# Patient Record
Sex: FEMALE | Race: WHITE | Employment: FULL TIME | URBAN - METROPOLITAN AREA
[De-identification: names, ages, dates, MRNs, and addresses within clinical notes are randomized per-mention and may not be internally consistent; named-entity substitution may affect disease eponyms.]

---

## 2017-01-06 PROBLEM — R00.1 SINUS BRADYCARDIA: Status: ACTIVE | Noted: 2017-01-06

## 2018-01-15 LAB
LEFT VENTRICULAR EJECTION FRACTION HIGH VALUE: 65 %
LEFT VENTRICULAR EJECTION FRACTION MODE: NORMAL
LV EF: 60 %

## 2019-01-14 ENCOUNTER — OFFICE VISIT (OUTPATIENT)
Dept: CARDIOLOGY CLINIC | Age: 34
End: 2019-01-14
Payer: MEDICARE

## 2019-01-14 VITALS
RESPIRATION RATE: 16 BRPM | WEIGHT: 107 LBS | HEART RATE: 66 BPM | SYSTOLIC BLOOD PRESSURE: 116 MMHG | DIASTOLIC BLOOD PRESSURE: 62 MMHG | HEIGHT: 57 IN | BODY MASS INDEX: 23.08 KG/M2

## 2019-01-14 DIAGNOSIS — I38 VHD (VALVULAR HEART DISEASE): ICD-10-CM

## 2019-01-14 DIAGNOSIS — Q90.9 DOWN SYNDROME: ICD-10-CM

## 2019-01-14 DIAGNOSIS — Q24.9 CONGENITAL HEART DISEASE: Primary | ICD-10-CM

## 2019-01-14 DIAGNOSIS — I36.1 NON-RHEUMATIC TRICUSPID VALVE INSUFFICIENCY: ICD-10-CM

## 2019-01-14 DIAGNOSIS — M71.22 SYNOVIAL CYST OF LEFT KNEE: ICD-10-CM

## 2019-01-14 DIAGNOSIS — M21.42 FLAT FEET, BILATERAL: ICD-10-CM

## 2019-01-14 DIAGNOSIS — I45.10 RBBB (RIGHT BUNDLE BRANCH BLOCK): ICD-10-CM

## 2019-01-14 DIAGNOSIS — I34.0 NON-RHEUMATIC MITRAL REGURGITATION: ICD-10-CM

## 2019-01-14 DIAGNOSIS — Q21.20 AV CANAL: ICD-10-CM

## 2019-01-14 DIAGNOSIS — I44.4 LAFB (LEFT ANTERIOR FASCICULAR BLOCK): ICD-10-CM

## 2019-01-14 DIAGNOSIS — E03.9 HYPOTHYROIDISM, UNSPECIFIED TYPE: ICD-10-CM

## 2019-01-14 DIAGNOSIS — M21.41 FLAT FEET, BILATERAL: ICD-10-CM

## 2019-01-14 PROCEDURE — G8484 FLU IMMUNIZE NO ADMIN: HCPCS | Performed by: INTERNAL MEDICINE

## 2019-01-14 PROCEDURE — 1036F TOBACCO NON-USER: CPT | Performed by: INTERNAL MEDICINE

## 2019-01-14 PROCEDURE — 93000 ELECTROCARDIOGRAM COMPLETE: CPT | Performed by: INTERNAL MEDICINE

## 2019-01-14 PROCEDURE — 99214 OFFICE O/P EST MOD 30 MIN: CPT | Performed by: INTERNAL MEDICINE

## 2019-01-14 PROCEDURE — G8427 DOCREV CUR MEDS BY ELIG CLIN: HCPCS | Performed by: INTERNAL MEDICINE

## 2019-01-14 PROCEDURE — G8420 CALC BMI NORM PARAMETERS: HCPCS | Performed by: INTERNAL MEDICINE

## 2019-01-14 RX ORDER — LEVOTHYROXINE SODIUM 88 UG/1
88 TABLET ORAL
COMMUNITY
End: 2021-03-22

## 2019-02-18 ENCOUNTER — HOSPITAL ENCOUNTER (OUTPATIENT)
Dept: CARDIOLOGY | Age: 34
Discharge: HOME | End: 2019-02-18
Payer: MEDICARE

## 2019-02-18 DIAGNOSIS — I34.0 NON-RHEUMATIC MITRAL REGURGITATION: ICD-10-CM

## 2019-02-18 DIAGNOSIS — Q24.9 CONGENITAL HEART DISEASE: ICD-10-CM

## 2019-02-18 DIAGNOSIS — Q21.20 AV CANAL: ICD-10-CM

## 2019-02-18 DIAGNOSIS — I38 VHD (VALVULAR HEART DISEASE): ICD-10-CM

## 2019-02-18 DIAGNOSIS — I36.1 NON-RHEUMATIC TRICUSPID VALVE INSUFFICIENCY: ICD-10-CM

## 2019-02-18 LAB
LV EF: 63 %
LVEF MODALITY: NORMAL

## 2019-02-18 PROCEDURE — 93306 TTE W/DOPPLER COMPLETE: CPT

## 2019-02-25 ENCOUNTER — TELEPHONE (OUTPATIENT)
Dept: CARDIOLOGY CLINIC | Age: 34
End: 2019-02-25

## 2019-02-27 ENCOUNTER — TELEPHONE (OUTPATIENT)
Dept: CARDIOLOGY CLINIC | Age: 34
End: 2019-02-27

## 2019-03-01 ENCOUNTER — TELEPHONE (OUTPATIENT)
Dept: CARDIOLOGY CLINIC | Age: 34
End: 2019-03-01

## 2020-01-13 ENCOUNTER — TELEPHONE (OUTPATIENT)
Dept: ADMINISTRATIVE | Age: 35
End: 2020-01-13

## 2020-01-13 ENCOUNTER — TELEPHONE (OUTPATIENT)
Dept: CARDIOLOGY CLINIC | Age: 35
End: 2020-01-13

## 2020-01-13 NOTE — TELEPHONE ENCOUNTER
Poly's mother called regarding her daughters yearly echo. Danyelle Ramírez gets an echo ordered every year due to  AV Canal and mild to moderate leak. Danyelle Ramírez does not have an Echo ordered and her mother  would like to have it done prior to coming in to see you in February.    Please advise

## 2020-02-13 ENCOUNTER — HOSPITAL ENCOUNTER (OUTPATIENT)
Dept: CARDIOLOGY | Age: 35
Discharge: HOME | End: 2020-02-13
Payer: MEDICARE

## 2020-02-13 LAB
LV EF: 60 %
LVEF MODALITY: NORMAL

## 2020-02-13 PROCEDURE — 93306 TTE W/DOPPLER COMPLETE: CPT

## 2020-02-21 ENCOUNTER — OFFICE VISIT (OUTPATIENT)
Dept: CARDIOLOGY CLINIC | Age: 35
End: 2020-02-21
Payer: MEDICARE

## 2020-02-21 VITALS
BODY MASS INDEX: 22.44 KG/M2 | DIASTOLIC BLOOD PRESSURE: 60 MMHG | HEIGHT: 57 IN | SYSTOLIC BLOOD PRESSURE: 110 MMHG | RESPIRATION RATE: 16 BRPM | WEIGHT: 104 LBS | HEART RATE: 54 BPM

## 2020-02-21 PROCEDURE — 99213 OFFICE O/P EST LOW 20 MIN: CPT | Performed by: INTERNAL MEDICINE

## 2020-02-21 PROCEDURE — 1036F TOBACCO NON-USER: CPT | Performed by: INTERNAL MEDICINE

## 2020-02-21 PROCEDURE — 93000 ELECTROCARDIOGRAM COMPLETE: CPT | Performed by: INTERNAL MEDICINE

## 2020-02-21 PROCEDURE — G8420 CALC BMI NORM PARAMETERS: HCPCS | Performed by: INTERNAL MEDICINE

## 2020-02-21 PROCEDURE — G8427 DOCREV CUR MEDS BY ELIG CLIN: HCPCS | Performed by: INTERNAL MEDICINE

## 2020-02-21 PROCEDURE — G8484 FLU IMMUNIZE NO ADMIN: HCPCS | Performed by: INTERNAL MEDICINE

## 2020-02-21 RX ORDER — LEVOTHYROXINE SODIUM 0.07 MG/1
75 TABLET ORAL DAILY
COMMUNITY

## 2020-02-25 NOTE — PROGRESS NOTES
or cold intolerance. MUSCULOSKELETAL: Positive for bilateral knee pain, the left is worse. She also has flat feet.     SKIN: Denies rashes, ulcers or itching. HEME/LYMPH: Denies lymphadenopathy, bleeding or easy bruisability. HEART: As above. LUNGS: She denies cough or sputum production. GI: Denies abdominal pain,nausea, vomiting, diarrhea, constipation, dark or bloody stools. : Denies hematuria or dysuria. PSYCHIATRIC: Denies mood changes, anxiety or depression. NEUROLOGIC: Denies memory deficit, motor weakness, numbness, tingling or tremors. No dizziness or syncope         CARDIOVASCULAR HISTORY:   1. AV canal defect, status post repair at age 10 months:  a. Echocardiogram done on 11/05/13 was read by Dr. Michael Zamarripa (pediatric cardiologist) as showing normal pulmonary venous drainage to the left atrium. Normal size atria with intact intra-atrial septum. Thickened mitral valve leaflets with mild-to-moderate mitral valve regurgitation via the cleft of the anterior leaflet and the coaptation point and mild tricuspid valve regurgitation. Normal size ventricles with intact interventricular septum and normal biventricular function. Normal pulmonic valve without stenosis and with mild pulmonic valve regurgitation. Bilateral pulmonary valve stenosis, mild. Normal aortic valve without stenosis or insufficiency. Normal aortic arch without coarctation. No pericardial effusion or vegetation seen. The coronary arteries appeared to rise normally from the appropriate Sinus of Valsalva.   b. Echocardiogram done on 1/4/2016 showed normal left ventricular size and wall thickness with septal motion consistent with post open heart state with normal left ventricular systolic function with an ejection fraction estimated at 60% and normal left ventricular diastolic function, normal right ventricular size and function. Mild to moderate mitral regurgitation, mild tricuspid regurgitation, mild pulmonary regurgitation. upstrokes normal bilaterally. No thyromegaly. Sclerae not icteric. No xanthelasmas. Mucous membranes moist.   Chest:              Symmetrical and nontender.  Well healed sternotomy incision. Lungs:             Clear bilaterally. Heart:              Normal S1. Split S2. Grade 2/6 systolic murmur heard at the apex. Grade 2/6 systolic murmur heard at the right upper sternal border. Grade 2/6 systolic murmur heard at the left upper sternal border. Abdomen:        Soft without organomegaly or masses. No bruits. Extremities:     Trace to 1+ pitting peritibial and ankle edema bilaterally. Pulses normal.   Skin:                Normal turgor. No rashes or ulcers noted. Neuro:             Oriented x 3. No motor or sensory deficit detected. REVIEW OF DIAGNOSTIC TESTS:    1. Blood tests from 1/13/2020 reviewed:  CBC unremarkable. BUN 14, creatinine 0.7, potassium 4.3, GFR > 60, LFT's normal.  Total cholesterol 178, triglycerides 61, HDL 67, LDL 99. TSH 0.40 (low) free T3, 3.69. Free T4 1.33.    2.  EKG done today showed sinus bradycardia with a heart rate of 53 bpm with left ventricular hypertrophy with right bundle branch block and left anterior fascicular block. ASSESSMENT / DIAGNOSIS:   1. Down's Syndrome. 2. Congenital heart disease: AV canal defect, status post surgical repair with mild mitral regurgitation and mild tricuspid regurgitation on echocardiogram in 2/2020.  3. Right bundle branch block. 4. Left anterior fascicular block. 5. Sinus bradycardia. 6. Hypothyroidism: On replacement therapy  7. Flat feet. 8. History of Baker's cysts.        TREATMENT PLAN:  1. Reassure. 2.  Thyroid replacement therapy adjustment as per endocrinology. 3.  Patient advised to continue with SBE prophylaxis. 4.  Follow up in 1 year or on a prn basis. Jony Haddad.  [place] [id number]  [phone]

## 2020-11-17 ENCOUNTER — TELEPHONE (OUTPATIENT)
Dept: CARDIOLOGY CLINIC | Age: 35
End: 2020-11-17

## 2021-01-04 ENCOUNTER — TELEPHONE (OUTPATIENT)
Dept: CARDIOLOGY CLINIC | Age: 36
End: 2021-01-04

## 2021-03-04 ENCOUNTER — TELEPHONE (OUTPATIENT)
Dept: CARDIOLOGY | Age: 36
End: 2021-03-04

## 2021-03-04 NOTE — TELEPHONE ENCOUNTER
Left patient's mother a message that Dr Bhavesh Ramachandran reviewed Lola Ernie last echo and she does not need to have one at this time. Echo appointment for 3/8 was cancelled.

## 2021-03-09 ENCOUNTER — TELEPHONE (OUTPATIENT)
Dept: CARDIOLOGY CLINIC | Age: 36
End: 2021-03-09

## 2021-03-22 ENCOUNTER — OFFICE VISIT (OUTPATIENT)
Dept: CARDIOLOGY CLINIC | Age: 36
End: 2021-03-22
Payer: MEDICARE

## 2021-03-22 VITALS
SYSTOLIC BLOOD PRESSURE: 116 MMHG | HEART RATE: 54 BPM | DIASTOLIC BLOOD PRESSURE: 65 MMHG | HEIGHT: 57 IN | BODY MASS INDEX: 22.22 KG/M2 | WEIGHT: 103 LBS | RESPIRATION RATE: 16 BRPM

## 2021-03-22 DIAGNOSIS — I45.2 RBBB (RIGHT BUNDLE BRANCH BLOCK WITH LEFT ANTERIOR FASCICULAR BLOCK): ICD-10-CM

## 2021-03-22 DIAGNOSIS — I34.0 NONRHEUMATIC MITRAL VALVE REGURGITATION: Primary | ICD-10-CM

## 2021-03-22 PROCEDURE — 1036F TOBACCO NON-USER: CPT | Performed by: INTERNAL MEDICINE

## 2021-03-22 PROCEDURE — G8427 DOCREV CUR MEDS BY ELIG CLIN: HCPCS | Performed by: INTERNAL MEDICINE

## 2021-03-22 PROCEDURE — G8420 CALC BMI NORM PARAMETERS: HCPCS | Performed by: INTERNAL MEDICINE

## 2021-03-22 PROCEDURE — 99215 OFFICE O/P EST HI 40 MIN: CPT | Performed by: INTERNAL MEDICINE

## 2021-03-22 PROCEDURE — G8484 FLU IMMUNIZE NO ADMIN: HCPCS | Performed by: INTERNAL MEDICINE

## 2021-03-22 PROCEDURE — 93000 ELECTROCARDIOGRAM COMPLETE: CPT | Performed by: INTERNAL MEDICINE

## 2021-03-22 RX ORDER — CLOTRIMAZOLE 1 %
CREAM (GRAM) TOPICAL
COMMUNITY
Start: 2021-03-09 | End: 2022-03-21 | Stop reason: CLARIF

## 2021-03-22 NOTE — PROGRESS NOTES
Transportation needs     Medical: Not on file     Non-medical: Not on file   Tobacco Use    Smoking status: Never Smoker    Smokeless tobacco: Never Used   Substance and Sexual Activity    Alcohol use: No     Comment: drinks green tea; min diet pop    Drug use: No    Sexual activity: Never   Lifestyle    Physical activity     Days per week: Not on file     Minutes per session: Not on file    Stress: Not on file   Relationships    Social connections     Talks on phone: Not on file     Gets together: Not on file     Attends Orthodox service: Not on file     Active member of club or organization: Not on file     Attends meetings of clubs or organizations: Not on file     Relationship status: Not on file    Intimate partner violence     Fear of current or ex partner: Not on file     Emotionally abused: Not on file     Physically abused: Not on file     Forced sexual activity: Not on file   Other Topics Concern    Not on file   Social History Narrative    Drinks occ diet pop       Family History   Problem Relation Age of Onset    Hypothyroidism Mother     Hypertension Father     Elevated Lipids Father          SUBJECTIVE: Masha Ken presents to the office today for re-evaluation of cardiac diagnoses. Down's syndrome with hx of surgical repair at age of 9. Followed in past by dr. Rossy Funez and most recently by dr. Josesito Harris. Last echo read by pediatric cardiology is as follows:  a. Echocardiogram done on 11/05/13 was read by Dr. Tracie Barrera (pediatric cardiologist) as showing normal pulmonary venous drainage to the left atrium. Normal size atria with intact intra-atrial septum. Thickened mitral valve leaflets with mild-to-moderate mitral valve regurgitation via the cleft of the anterior leaflet and the coaptation point and mild tricuspid valve regurgitation. Normal size ventricles with intact interventricular septum and normal biventricular function.  Normal pulmonic valve without stenosis and with mild pulmonic valve regurgitation. Bilateral pulmonary valve stenosis, mild. Normal aortic valve without stenosis or insufficiency. Normal aortic arch without coarctation. No pericardial effusion or vegetation seen. The coronary arteries appeared to rise normally from the appropriate Sinus of Valsalva. She complains of no cardiac symptoms - just back from [address] for extended visit where she walked daily on the beach etc, but locally very sedentary and denies   chest pain, chest pressure/discomfort, claudication, dyspnea, exertional chest pressure/discomfort, irregular heart beat, lower extremity edema, near-syncope, orthopnea, palpitations, paroxysmal nocturnal dyspnea, syncope and tachypnea. Mom states Marlee Bowers had COVID and had a period of time when she was \"blue\" that spontaneously resolved. Review of Systems:   Heart: as above   Lungs: as above   Eyes: denies changes in vision or discharge. Ears: denies changes in hearing or pain. Nose: denies epistaxis or masses   Throat: denies sore throat or trouble swallowing. Neuro: denies numbness, tingling, tremors. Skin: denies rashes or itching. : denies hematuria, dysuria   GI: denies vomiting, diarrhea   Psych: denies mood changed, anxiety, depression. all others negative. Physical Exam   /65   Pulse 54   Resp 16   Ht 4' 9\" (1.448 m)   Wt 103 lb (46.7 kg)   BMI 22.29 kg/m²   Constitutional: Down's fascies  No distress. Head: Normocephalic and atraumatic. Eyes: EOM are normal. Pupils are equal, round, and reactive to light. Neck: Normal range of motion. Neck supple. No hepatojugular reflux and no JVD present. Carotid bruit is not present. No tracheal deviation present. No thyromegaly present. Cardiovascular: Normal rate, regular rhythm, normal heart sounds and intact distal pulses. Exam reveals no gallop and no friction rub. Grade I/VI systolic ejection murmur at base of heart.  NO TR or MR murmur heard.  Pulmonary/Chest: Effort normal and breath sounds normal. No respiratory distress. No wheezes. No rales. No tenderness. Abdominal: Soft. Bowel sounds are normal. No distension and no mass. No tenderness. No rebound and no guarding. Musculoskeletal: flat feet. No edema and no tenderness. Neurological: Alert and oriented to person, place, and time. Skin: Skin is warm and dry. No rash noted. Not diaphoretic. No erythema. Psychiatric: Normal mood and affect. Behavior is normal.     EKG:  normal sinus rhythm, rate 54 bpm, axis -58, RBBB and LAFB, unchanged from previous tracings.     ASSESSMENT AND PLAN:  Patient Active Problem List   Diagnosis    AV canal    Mitral valve regurgitation    Tricuspid valve regurgitation    RBBB (right bundle branch block with left anterior fascicular block)    Down syndrome    Hypothyroidism    Baker's cyst of knee    Flat feet, bilateral    Sinus bradycardia     Down's syndrome with AV canal repair at age 9 years  No old records to describe technique  I reviewed last echo of 2020 which shows preserved ventricular function and no PHTN, despite TR and MR  No symptoms/signs of heart failure  No hx or ekg evidence of advanced AVB or AF  Will echo       Marylu Cowden, M.D  [facility]

## 2021-03-31 ENCOUNTER — TELEPHONE (OUTPATIENT)
Dept: CARDIOLOGY | Age: 36
End: 2021-03-31

## 2021-03-31 NOTE — TELEPHONE ENCOUNTER
SCHEDULED ECHO FOR 04-19-21. REVIEWED COVID CHECKLIST WITH PATIENT.     Electronically signed by Lucy Yanes on 3/31/2021 at 11:27 AM

## 2021-04-19 ENCOUNTER — HOSPITAL ENCOUNTER (OUTPATIENT)
Dept: CARDIOLOGY | Age: 36
Discharge: HOME | End: 2021-04-19
Payer: MEDICARE

## 2021-04-19 DIAGNOSIS — I45.2 RBBB (RIGHT BUNDLE BRANCH BLOCK WITH LEFT ANTERIOR FASCICULAR BLOCK): ICD-10-CM

## 2021-04-19 DIAGNOSIS — I34.0 NONRHEUMATIC MITRAL VALVE REGURGITATION: ICD-10-CM

## 2021-04-19 LAB
LV EF: 60 %
LVEF MODALITY: NORMAL

## 2021-04-19 PROCEDURE — 93306 TTE W/DOPPLER COMPLETE: CPT

## 2021-04-20 ENCOUNTER — TELEPHONE (OUTPATIENT)
Dept: CARDIOLOGY CLINIC | Age: 36
End: 2021-04-20

## 2021-04-20 NOTE — TELEPHONE ENCOUNTER
----- Message from Dottie Wade MD sent at 4/19/2021  1:32 PM EDT -----  Echo looks great  No change  Ov one year

## 2022-03-21 ENCOUNTER — OFFICE VISIT (OUTPATIENT)
Dept: CARDIOLOGY CLINIC | Age: 37
End: 2022-03-21
Payer: MEDICARE

## 2022-03-21 VITALS
HEIGHT: 57 IN | DIASTOLIC BLOOD PRESSURE: 64 MMHG | BODY MASS INDEX: 22.65 KG/M2 | HEART RATE: 58 BPM | SYSTOLIC BLOOD PRESSURE: 109 MMHG | RESPIRATION RATE: 14 BRPM | WEIGHT: 105 LBS

## 2022-03-21 DIAGNOSIS — Q21.20 AV CANAL: Primary | ICD-10-CM

## 2022-03-21 PROCEDURE — 1036F TOBACCO NON-USER: CPT | Performed by: INTERNAL MEDICINE

## 2022-03-21 PROCEDURE — G8420 CALC BMI NORM PARAMETERS: HCPCS | Performed by: INTERNAL MEDICINE

## 2022-03-21 PROCEDURE — G8427 DOCREV CUR MEDS BY ELIG CLIN: HCPCS | Performed by: INTERNAL MEDICINE

## 2022-03-21 PROCEDURE — 93000 ELECTROCARDIOGRAM COMPLETE: CPT | Performed by: INTERNAL MEDICINE

## 2022-03-21 PROCEDURE — G8484 FLU IMMUNIZE NO ADMIN: HCPCS | Performed by: INTERNAL MEDICINE

## 2022-03-21 PROCEDURE — 99214 OFFICE O/P EST MOD 30 MIN: CPT | Performed by: INTERNAL MEDICINE

## 2022-03-21 RX ORDER — CLOBETASOL PROPIONATE 0.5 MG/G
CREAM TOPICAL 2 TIMES DAILY
COMMUNITY

## 2022-03-21 RX ORDER — CHLORAL HYDRATE 500 MG
3000 CAPSULE ORAL 3 TIMES DAILY
COMMUNITY

## 2022-03-21 NOTE — PROGRESS NOTES
Chief Complaint   Patient presents with    Cardiac Valve Problem       Patient Active Problem List    Diagnosis Date Noted    Sinus bradycardia 01/06/2017    AV canal 01/16/2015     Overview Note:     A. s/p surgical repair  B. Echo 4/19/2021: mild MR      Mitral valve regurgitation 01/16/2015    RBBB (right bundle branch block with left anterior fascicular block) 01/16/2015    Down syndrome 01/16/2015    Hypothyroidism 01/16/2015    Baker's cyst of knee 01/16/2015    Flat feet, bilateral 01/16/2015       Current Outpatient Medications   Medication Sig Dispense Refill    Omega-3 Fatty Acids (FISH OIL) 1000 MG CAPS Take 3,000 mg by mouth 3 times daily      clobetasol (TEMOVATE) 0.05 % cream Apply topically 2 times daily Apply topically 2 times daily.  fluocinonide (LIDEX) 0.05 % cream Apply topically 2 times daily Apply topically 2 times daily.  levothyroxine (SYNTHROID) 75 MCG tablet Take 75 mcg by mouth Daily       docusate sodium (COLACE) 100 MG capsule Take 100 mg by mouth nightly       Biotin (BIOTIN 5000) 5 MG CAPS Take 10,000 mg by mouth daily       Multiple Vitamins-Minerals (THERAPEUTIC MULTIVITAMIN-MINERALS) tablet Take 1 tablet by mouth daily.       Vitamin D (CHOLECALCIFEROL) 1000 UNITS CAPS capsule Take 2,000 Units by mouth daily        Current Facility-Administered Medications   Medication Dose Route Frequency Provider Last Rate Last Admin    perflutren lipid microspheres (DEFINITY) injection 1.65 mg  1.5 mL IntraVENous ONCE PRN Roberta Locke MD            Allergies   Allergen Reactions    Pcn [Penicillins] Rash    Sulfa Antibiotics Rash       Vitals:    03/21/22 1012   BP: 109/64   Pulse: 58   Resp: 14   Weight: 105 lb (47.6 kg)   Height: 4' 9\" (1.448 m)       Social History     Socioeconomic History    Marital status: Single     Spouse name: Not on file    Number of children: Not on file    Years of education: Not on file    Highest education level: Not on file Occupational History    Not on file   Tobacco Use    Smoking status: Never Smoker    Smokeless tobacco: Never Used   Substance and Sexual Activity    Alcohol use: No     Comment: drinks green tea; min diet pop    Drug use: No    Sexual activity: Never   Other Topics Concern    Not on file   Social History Narrative    Drinks occ diet pop     Social Determinants of Health     Financial Resource Strain:     Difficulty of Paying Living Expenses: Not on file   Food Insecurity:     Worried About Running Out of Food in the Last Year: Not on file    Alec of Food in the Last Year: Not on file   Transportation Needs:     Lack of Transportation (Medical): Not on file    Lack of Transportation (Non-Medical): Not on file   Physical Activity:     Days of Exercise per Week: Not on file    Minutes of Exercise per Session: Not on file   Stress:     Feeling of Stress : Not on file   Social Connections:     Frequency of Communication with Friends and Family: Not on file    Frequency of Social Gatherings with Friends and Family: Not on file    Attends Hoahaoism Services: Not on file    Active Member of [address] [place] [address] or Organizations: Not on file    Attends Club or Organization Meetings: Not on file    Marital Status: Not on file   Intimate Partner Violence:     Fear of Current or Ex-Partner: Not on file    Emotionally Abused: Not on file    Physically Abused: Not on file    Sexually Abused: Not on file   Housing Stability:     Unable to Pay for Housing in the Last Year: Not on file    Number of [place] in the Last Year: Not on file    Unstable Housing in the Last Year: Not on file       Family History   Problem Relation Age of Onset    Hypothyroidism Mother     Hypertension Father     Elevated Lipids Father          SUBJECTIVE: Cat Jacinto presents to the office today for re-evaluation of cardiac diagnoses. Down's syndrome with hx of surgical repair at age of 9.     Since our last visit, no change in clinical status, nor change in echo 4/2021  Had COVID at second time and suffered alopecia. She complains of no cardiac symptoms - just back from [address] for extended visit where she walked a little on the beach etc, but locally very sedentary and denies   chest pain, chest pressure/discomfort, claudication, dyspnea, exertional chest pressure/discomfort, irregular heart beat, lower extremity edema, near-syncope, orthopnea, palpitations, paroxysmal nocturnal dyspnea, syncope and tachypnea. Last echo read by pediatric cardiology is as follows:  a. Echocardiogram done on 11/05/13 was read by Dr. Livan White (pediatric cardiologist) as showing normal pulmonary venous drainage to the left atrium. Normal size atria with intact intra-atrial septum. Thickened mitral valve leaflets with mild-to-moderate mitral valve regurgitation via the cleft of the anterior leaflet and the coaptation point and mild tricuspid valve regurgitation. Normal size ventricles with intact interventricular septum and normal biventricular function. Normal pulmonic valve without stenosis and with mild pulmonic valve regurgitation. Bilateral pulmonary valve stenosis, mild. Normal aortic valve without stenosis or insufficiency. Normal aortic arch without coarctation. No pericardial effusion or vegetation seen. The coronary arteries appeared to rise normally from the appropriate Sinus of Valsalva. Review of Systems:   Heart: as above   Lungs: as above   Eyes: denies changes in vision or discharge. Ears: denies changes in hearing or pain. Nose: denies epistaxis or masses   Throat: denies sore throat or trouble swallowing. Neuro: denies numbness, tingling, tremors. Skin: denies rashes or itching. : denies hematuria, dysuria   GI: denies vomiting, diarrhea   Psych: denies mood changed, anxiety, depression. all others negative.     Physical Exam   /64   Pulse 58   Resp 14   Ht 4' 9\" (1.448 m)   Wt 105 lb (47.6 kg)   BMI 22.72 kg/m²   Constitutional: Down's fascies  No distress. Head: Normocephalic and atraumatic. Eyes: EOM are normal. Pupils are equal, round, and reactive to light. Neck: Normal range of motion. Neck supple. No hepatojugular reflux and no JVD present. Carotid bruit is not present. No tracheal deviation present. No thyromegaly present. Cardiovascular: Normal rate, regular rhythm, normal heart sounds and intact distal pulses. Exam reveals no gallop and no friction rub. Grade I/VI systolic ejection murmur at base of heart. NO TR or MR murmur heard. Pulmonary/Chest: Effort normal and breath sounds normal. No respiratory distress. No wheezes. No rales. No tenderness. Abdominal: Soft. Bowel sounds are normal. No distension and no mass. No tenderness. No rebound and no guarding. Musculoskeletal: flat feet. No edema and no tenderness. Neurological: Alert and oriented to person, place, and time. Skin: Skin is warm and dry. No rash noted. Not diaphoretic. No erythema. Psychiatric: Normal mood and affect. Behavior is normal.     EKG:  Sinus bradycardia, RBBB and LAFB, unchanged from previous tracings.     ASSESSMENT AND PLAN:  Patient Active Problem List   Diagnosis    AV canal    Mitral valve regurgitation    RBBB (right bundle branch block with left anterior fascicular block)    Down syndrome                Sinus bradycardia     Down's syndrome with AV canal repair at age 9 years  No old records to describe technique  echo of 2021 shows preserved ventricular function and no PHTN, and only mild and MR, trace TR  No symptoms/signs of heart failure  No hx or ekg evidence of advanced AVB or AF    OV one year      [facility]

## 2022-04-19 ENCOUNTER — HOSPITAL ENCOUNTER (OUTPATIENT)
Dept: GENERAL RADIOLOGY | Age: 37
Discharge: HOME | End: 2022-04-21
Payer: MEDICARE

## 2022-04-19 ENCOUNTER — HOSPITAL ENCOUNTER (OUTPATIENT)
Age: 37
Discharge: HOME | End: 2022-04-21
Payer: MEDICARE

## 2022-04-19 DIAGNOSIS — R22.0 INTRACRANIAL SWELLING: ICD-10-CM

## 2022-04-19 PROCEDURE — 70250 X-RAY EXAM OF SKULL: CPT

## 2023-02-27 ENCOUNTER — OFFICE VISIT (OUTPATIENT)
Dept: CARDIOLOGY CLINIC | Age: 38
End: 2023-02-27
Payer: MEDICARE

## 2023-02-27 VITALS
DIASTOLIC BLOOD PRESSURE: 66 MMHG | HEART RATE: 57 BPM | HEIGHT: 57 IN | BODY MASS INDEX: 23.17 KG/M2 | SYSTOLIC BLOOD PRESSURE: 102 MMHG | RESPIRATION RATE: 18 BRPM | WEIGHT: 107.4 LBS

## 2023-02-27 DIAGNOSIS — I45.2 RBBB (RIGHT BUNDLE BRANCH BLOCK WITH LEFT ANTERIOR FASCICULAR BLOCK): Primary | ICD-10-CM

## 2023-02-27 DIAGNOSIS — Q21.20 AV CANAL: ICD-10-CM

## 2023-02-27 PROCEDURE — G8420 CALC BMI NORM PARAMETERS: HCPCS | Performed by: INTERNAL MEDICINE

## 2023-02-27 PROCEDURE — 99214 OFFICE O/P EST MOD 30 MIN: CPT | Performed by: INTERNAL MEDICINE

## 2023-02-27 PROCEDURE — 1036F TOBACCO NON-USER: CPT | Performed by: INTERNAL MEDICINE

## 2023-02-27 PROCEDURE — G8427 DOCREV CUR MEDS BY ELIG CLIN: HCPCS | Performed by: INTERNAL MEDICINE

## 2023-02-27 PROCEDURE — 93000 ELECTROCARDIOGRAM COMPLETE: CPT | Performed by: INTERNAL MEDICINE

## 2023-02-27 PROCEDURE — G8484 FLU IMMUNIZE NO ADMIN: HCPCS | Performed by: INTERNAL MEDICINE

## 2023-02-27 NOTE — PROGRESS NOTES
Chief Complaint   Patient presents with    Valvular Heart Disease       Patient Active Problem List    Diagnosis Date Noted    Sinus bradycardia 01/06/2017    AV canal 01/16/2015     Overview Note:     A. s/p surgical repair  B. Echo 4/19/2021: mild MR      Mitral valve regurgitation 01/16/2015    RBBB (right bundle branch block with left anterior fascicular block) 01/16/2015    Down syndrome 01/16/2015    Hypothyroidism 01/16/2015    Baker's cyst of knee 01/16/2015    Flat feet, bilateral 01/16/2015       Current Outpatient Medications   Medication Sig Dispense Refill    Omega-3 Fatty Acids (FISH OIL PO) Take 3,000 mg by mouth 3 times daily      clobetasol (TEMOVATE) 0.05 % cream Apply topically 2 times daily Apply topically 2 times daily. fluocinonide (LIDEX) 0.05 % cream Apply topically 2 times daily Apply topically 2 times daily. levothyroxine (SYNTHROID) 75 MCG tablet Take 75 mcg by mouth Daily       docusate sodium (COLACE) 100 MG capsule Take 100 mg by mouth nightly      Biotin 5 MG CAPS Take 10,000 mg by mouth daily       Multiple Vitamins-Minerals (THERAPEUTIC MULTIVITAMIN-MINERALS) tablet Take 1 tablet by mouth daily. Cholecalciferol (VITAMIN D) 50 MCG (2000 UT) CAPS capsule Take 2,000 Units by mouth daily        Current Facility-Administered Medications   Medication Dose Route Frequency Provider Last Rate Last Admin    perflutren lipid microspheres (DEFINITY) injection 1.5 mL  1.5 mL IntraVENous ONCE PRN Teri Smith MD            Allergies   Allergen Reactions    Pcn [Penicillins] Rash    Sulfa Antibiotics Rash       Vitals:    02/27/23 1009   BP: 102/66   Pulse: 57   Resp: 18   Weight: 107 lb 6.4 oz (48.7 kg)   Height: 4' 9\" (1.448 m)               SUBJECTIVE: Aurora Rivera presents to the office today for re-evaluation of cardiac diagnoses. Down's syndrome with hx of surgical repair at age of 9.     Since our last visit, no change in clinical status, nor change in echo 4/2021  Had COVID at second time and suffered alopecia. She complains of no cardiac symptoms - just back from [address] for extended visit where she walked a little on the beach etc, but locally very sedentary and denies   chest pain, chest pressure/discomfort, claudication, dyspnea, exertional chest pressure/discomfort, irregular heart beat, lower extremity edema, near-syncope, orthopnea, palpitations, paroxysmal nocturnal dyspnea, syncope and tachypnea. Last echo read by pediatric cardiology is as follows:  a. Echocardiogram done on 11/05/13 was read by Dr. Pepe Matthews (pediatric cardiologist) as showing normal pulmonary venous drainage to the left atrium. Normal size atria with intact intra-atrial septum. Thickened mitral valve leaflets with mild-to-moderate mitral valve regurgitation via the cleft of the anterior leaflet and the coaptation point and mild tricuspid valve regurgitation. Normal size ventricles with intact interventricular septum and normal biventricular function. Normal pulmonic valve without stenosis and with mild pulmonic valve regurgitation. Bilateral pulmonary valve stenosis, mild. Normal aortic valve without stenosis or insufficiency. Normal aortic arch without coarctation. No pericardial effusion or vegetation seen. The coronary arteries appeared to rise normally from the appropriate Sinus of Valsalva. Physical Exam   /66   Pulse 57   Resp 18   Ht 4' 9\" (1.448 m)   Wt 107 lb 6.4 oz (48.7 kg)   BMI 23.24 kg/m²   Constitutional: Down's fascies  No distress. Neck: no JVD present. Carotid bruit is not present. No tracheal deviation present. No thyromegaly present. Cardiovascular: Normal rate, regular rhythm, normal heart sounds and intact distal pulses. Exam reveals no gallop and no friction rub. Grade I/VI systolic ejection murmur at base of heart. NO TR or MR murmur heard.   Pulmonary/Chest: Effort normal and breath sounds normal. No respiratory distress. No wheezes. No rales. Abdominal: Soft. Bowel sounds are normal.   Musculoskeletal: flat feet. No edema  Neurological: Alert and oriented to person, place, and time. Skin: Skin is warm and dry. Psychiatric: Normal mood and affect. Behavior is normal.     EKG:  Sinus bradycardia, RBBB and LAFB, unchanged from previous tracings.     ASSESSMENT AND PLAN:  Patient Active Problem List   Diagnosis    AV canal    Mitral valve regurgitation    RBBB (right bundle branch block with left anterior fascicular block)    Down syndrome                Sinus bradycardia     Down's syndrome with AV canal repair at age 9 years  No old records to describe technique  echo of 2021 shows preserved ventricular function and no PHTN, and only mild and MR, trace TR  No symptoms/signs of heart failure  No hx or ekg evidence of advanced AVB or AF    Echo       Jean Stevens M.D  [id number] [facility]

## 2023-03-06 ENCOUNTER — HOSPITAL ENCOUNTER (OUTPATIENT)
Dept: CARDIOLOGY | Age: 38
Discharge: HOME | End: 2023-03-06
Payer: MEDICARE

## 2023-03-06 DIAGNOSIS — Q21.20 AV CANAL: ICD-10-CM

## 2023-03-06 DIAGNOSIS — I45.2 RBBB (RIGHT BUNDLE BRANCH BLOCK WITH LEFT ANTERIOR FASCICULAR BLOCK): ICD-10-CM

## 2023-03-06 LAB
LV EF: 63 %
LVEF MODALITY: NORMAL

## 2023-03-06 PROCEDURE — 93306 TTE W/DOPPLER COMPLETE: CPT | Performed by: PSYCHIATRY & NEUROLOGY

## 2023-03-07 ENCOUNTER — TELEPHONE (OUTPATIENT)
Dept: CARDIOLOGY CLINIC | Age: 38
End: 2023-03-07

## 2023-03-07 NOTE — TELEPHONE ENCOUNTER
----- Message from Haider Santamaria MD sent at 3/7/2023  6:55 AM EST -----  Echo looks great  Ov one year      ----- Message -----  From: Nasim Cole Incoming Cardiology [facility] [place]  Sent: 3/7/2023   6:53 AM EST  To: Haider Santamaria MD

## 2023-07-18 NOTE — TELEPHONE ENCOUNTER
Food & Nutrition  Education    Diet Education: Cardiac Diet  Time Spent: 15 min  Learners: Sophia Landis      Nutrition Education provided with handouts: DASH diet - Heart Healthy Diet - Diet and Health.     Comments: RD consult for education on cardiac diet. Pt educated with daughter in the room. Educations left in patient room and attached to pt chart.      All questions and concerns answered. Dietitian's contact information provided.       Please Re-consult as needed        Thanks!   Angelica Abreu, Registration Eligible, Provisional LDN   Patients mother notified and instructed on echo results and follow-up

## 2024-03-05 ENCOUNTER — OFFICE VISIT (OUTPATIENT)
Dept: CARDIOLOGY CLINIC | Age: 39
End: 2024-03-05
Payer: MEDICARE

## 2024-03-05 VITALS
HEART RATE: 55 BPM | RESPIRATION RATE: 18 BRPM | BODY MASS INDEX: 23.34 KG/M2 | SYSTOLIC BLOOD PRESSURE: 106 MMHG | WEIGHT: 108.2 LBS | HEIGHT: 57 IN | DIASTOLIC BLOOD PRESSURE: 80 MMHG

## 2024-03-05 DIAGNOSIS — Q21.20 AV CANAL: Primary | ICD-10-CM

## 2024-03-05 PROCEDURE — 93000 ELECTROCARDIOGRAM COMPLETE: CPT | Performed by: INTERNAL MEDICINE

## 2024-03-05 PROCEDURE — 1036F TOBACCO NON-USER: CPT | Performed by: INTERNAL MEDICINE

## 2024-03-05 PROCEDURE — 99213 OFFICE O/P EST LOW 20 MIN: CPT | Performed by: INTERNAL MEDICINE

## 2024-03-05 PROCEDURE — G8420 CALC BMI NORM PARAMETERS: HCPCS | Performed by: INTERNAL MEDICINE

## 2024-03-05 PROCEDURE — G8484 FLU IMMUNIZE NO ADMIN: HCPCS | Performed by: INTERNAL MEDICINE

## 2024-03-05 PROCEDURE — G8427 DOCREV CUR MEDS BY ELIG CLIN: HCPCS | Performed by: INTERNAL MEDICINE

## 2024-03-05 RX ORDER — DIMENHYDRINATE 50 MG
1000 TABLET ORAL DAILY
COMMUNITY

## 2024-03-05 RX ORDER — VITAMIN E 268 MG
400 CAPSULE ORAL DAILY
COMMUNITY

## 2024-03-05 NOTE — PROGRESS NOTES
[facility] for extended visit where she walks, but locally very sedentary and denies   chest pain, chest pressure/discomfort, claudication, dyspnea, exertional chest pressure/discomfort, irregular heart beat, lower extremity edema, near-syncope, orthopnea, palpitations, paroxysmal nocturnal dyspnea, syncope and tachypnea.      Last echo read by pediatric cardiology is as follows:  a. Echocardiogram done on 11/05/13 was read by Dr. Baldev Oliveira (pediatric cardiologist) as showing normal pulmonary venous drainage to the left atrium. Normal size atria with intact intra-atrial septum. Thickened mitral valve leaflets with mild-to-moderate mitral valve regurgitation via the cleft of the anterior leaflet and the coaptation point and mild tricuspid valve regurgitation. Normal size ventricles with intact interventricular septum and normal biventricular function. Normal pulmonic valve without stenosis and with mild pulmonic valve regurgitation. Bilateral pulmonary valve stenosis, mild. Normal aortic valve without stenosis or insufficiency. Normal aortic arch without coarctation. No pericardial effusion or vegetation seen. The coronary arteries appeared to rise normally from the appropriate Sinus of Valsalva.          Physical Exam   /80   Pulse 55   Resp 18   Ht 1.448 m (4' 9\")   Wt 49.1 kg (108 lb 3.2 oz)   BMI 23.41 kg/m²   Constitutional: Down's fascies  No distress.    Neck: no JVD present.   Cardiovascular: Normal rate, regular rhythm, normal heart sounds and intact distal pulses.  Exam reveals no gallop and no friction rub.  Grade I/VI systolic ejection murmur at base of heart. NO TR or MR murmur heard.  Pulmonary/Chest: Effort normal and breath sounds normal. No respiratory distress. No wheezes. No rales.    Abdominal: Soft. Bowel sounds are normal.   Musculoskeletal: flat feet. No edema  Neurological: Alert and oriented to person, place, and time.   Skin: Skin is warm and dry.   Psychiatric:

## 2024-11-18 ENCOUNTER — HOSPITAL ENCOUNTER (OUTPATIENT)
Age: 39
Discharge: HOME | End: 2024-11-20
Payer: MEDICAID

## 2024-11-18 ENCOUNTER — HOSPITAL ENCOUNTER (OUTPATIENT)
Dept: MAMMOGRAPHY | Age: 39
Discharge: HOME | End: 2024-11-20
Payer: MEDICAID

## 2024-11-18 VITALS — HEIGHT: 57 IN | WEIGHT: 110 LBS | BODY MASS INDEX: 23.73 KG/M2

## 2024-11-18 DIAGNOSIS — Z12.31 ENCOUNTER FOR SCREENING MAMMOGRAM FOR MALIGNANT NEOPLASM OF BREAST: ICD-10-CM

## 2024-11-18 PROCEDURE — 77063 BREAST TOMOSYNTHESIS BI: CPT

## 2025-03-04 ENCOUNTER — OFFICE VISIT (OUTPATIENT)
Dept: CARDIOLOGY CLINIC | Age: 40
End: 2025-03-04
Payer: MEDICARE

## 2025-03-04 VITALS
WEIGHT: 110.8 LBS | DIASTOLIC BLOOD PRESSURE: 50 MMHG | HEART RATE: 62 BPM | RESPIRATION RATE: 18 BRPM | HEIGHT: 57 IN | SYSTOLIC BLOOD PRESSURE: 104 MMHG | BODY MASS INDEX: 23.9 KG/M2

## 2025-03-04 DIAGNOSIS — I45.2 RBBB (RIGHT BUNDLE BRANCH BLOCK WITH LEFT ANTERIOR FASCICULAR BLOCK): Primary | ICD-10-CM

## 2025-03-04 DIAGNOSIS — Q21.20 AV CANAL: ICD-10-CM

## 2025-03-04 DIAGNOSIS — R01.1 HEART MURMUR: ICD-10-CM

## 2025-03-04 PROCEDURE — G8420 CALC BMI NORM PARAMETERS: HCPCS | Performed by: INTERNAL MEDICINE

## 2025-03-04 PROCEDURE — 93000 ELECTROCARDIOGRAM COMPLETE: CPT | Performed by: INTERNAL MEDICINE

## 2025-03-04 PROCEDURE — 1036F TOBACCO NON-USER: CPT | Performed by: INTERNAL MEDICINE

## 2025-03-04 PROCEDURE — G8427 DOCREV CUR MEDS BY ELIG CLIN: HCPCS | Performed by: INTERNAL MEDICINE

## 2025-03-04 PROCEDURE — 99214 OFFICE O/P EST MOD 30 MIN: CPT | Performed by: INTERNAL MEDICINE

## 2025-03-04 RX ORDER — MULTIVIT WITH MINERALS/LUTEIN
250 TABLET ORAL DAILY
COMMUNITY

## 2025-03-04 RX ORDER — NYSTATIN 100000 U/G
CREAM TOPICAL 2 TIMES DAILY
COMMUNITY

## 2025-03-04 RX ORDER — CLINDAMYCIN HYDROCHLORIDE 300 MG/1
CAPSULE ORAL AS NEEDED
COMMUNITY
Start: 2024-05-20

## 2025-03-04 NOTE — PROGRESS NOTES
Chief Complaint   Patient presents with    Valvular Heart Disease       Patient Active Problem List    Diagnosis Date Noted    Sinus bradycardia 01/06/2017    AV canal 01/16/2015     Overview Note:     A. s/p surgical repair  B. Echo 4/19/2021: mild MR  C. 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      Flaxseed, Linseed, (FLAX SEED OIL) 1000 MG CAPS Take 1,000 mg by mouth daily      Omega-3 Fatty Acids (FISH OIL PO) Take 3,000 mg by mouth daily      clobetasol (TEMOVATE) 0.05 % cream Apply topically 2 times daily Apply topically 2 times daily.      fluocinonide (LIDEX) 0.05 % cream Apply topically 2 times daily Apply topically 2 times daily.      levothyroxine (SYNTHROID) 75 MCG tablet Take 1 tablet by mouth Daily      docusate sodium (COLACE) 100 MG capsule Take 1 capsule by mouth nightly      Biotin 5 MG CAPS Take 10,000 mg by mouth daily       Multiple Vitamins-Minerals (THERAPEUTIC MULTIVITAMIN-MINERALS) tablet Take 1 tablet by mouth daily      Cholecalciferol (VITAMIN D) 50 MCG (2000 UT) CAPS capsule Take 1 capsule by mouth daily       No current facility-administered medications for this visit.        Allergies   Allergen Reactions    Pcn [Penicillins] Rash    Sulfa Antibiotics Rash       Vitals:    03/04/25 1000   BP: (!) 104/50   Pulse: 62   Resp: 18   Weight: 50.3 kg (110 lb 12.8 oz)   Height: 1.448 m (4' 9\")               SUBJECTIVE:

## 2025-03-05 ENCOUNTER — OFFICE VISIT (OUTPATIENT)
Dept: ENDOCRINOLOGY | Age: 40
End: 2025-03-05

## 2025-03-05 VITALS
BODY MASS INDEX: 23.73 KG/M2 | DIASTOLIC BLOOD PRESSURE: 69 MMHG | HEIGHT: 57 IN | HEART RATE: 100 BPM | WEIGHT: 110 LBS | SYSTOLIC BLOOD PRESSURE: 108 MMHG | OXYGEN SATURATION: 74 %

## 2025-03-05 DIAGNOSIS — E55.9 VITAMIN D DEFICIENCY: ICD-10-CM

## 2025-03-05 DIAGNOSIS — E03.9 HYPOTHYROIDISM, UNSPECIFIED TYPE: Primary | ICD-10-CM

## 2025-03-05 NOTE — PROGRESS NOTES
Loyd, a 39 y.o.-old female seen in for following issues     Primary hypothyroidism   Currently levothyroxine 75 mcg daily  Will obtain TFTs and adjust the dose if needed    Vitamin D deficiency  Continue vitamin D supplements  Will check vitamin D level    I personally reviewed external notes from PCP and other patient's care team providers, and personally interpreted labs associated with the above diagnosis. I also ordered labs to further assess and manage the above addressed medical conditions.     Return in about 6 months (around 9/5/2025) for hypothyroidism, VitD deficiency.    The above issues were reviewed with the patient who understood and agreed with the plan.    Thank you for allowing us to participate in the care of this patient. Please do not hesitate to contact us with any additional questions.      Diagnosis Orders   1. Hypothyroidism, unspecified type  TSH    T4, Free    Vitamin D 25 Hydroxy      2. Vitamin D deficiency  Vitamin D 25 Hydroxy          Flex Rangel MD  Endocrinologist, [facility]   [address] [id number]  [facility] [address]  [phone]   ------------------------------  An electronic signature was used to authenticate this note. Flex Rangel MD on 3/5/2025 at 8:13 AM

## 2025-03-18 ENCOUNTER — RESULTS FOLLOW-UP (OUTPATIENT)
Dept: CARDIOLOGY | Age: 40
End: 2025-03-18

## 2025-03-18 ENCOUNTER — TELEPHONE (OUTPATIENT)
Dept: ENDOCRINOLOGY | Age: 40
End: 2025-03-18

## 2025-03-18 ENCOUNTER — HOSPITAL ENCOUNTER (OUTPATIENT)
Dept: CARDIOLOGY | Age: 40
Discharge: HOME | End: 2025-03-20
Attending: INTERNAL MEDICINE
Payer: MEDICARE

## 2025-03-18 VITALS
SYSTOLIC BLOOD PRESSURE: 108 MMHG | WEIGHT: 110 LBS | HEIGHT: 57 IN | DIASTOLIC BLOOD PRESSURE: 78 MMHG | BODY MASS INDEX: 23.73 KG/M2

## 2025-03-18 DIAGNOSIS — R01.1 HEART MURMUR: ICD-10-CM

## 2025-03-18 LAB
ECHO AO ASC DIAM: 2.5 CM
ECHO AO ASCENDING AORTA INDEX: 1.8 CM/M2
ECHO AV AREA PEAK VELOCITY: 1.7 CM2
ECHO AV AREA VTI: 1.8 CM2
ECHO AV AREA/BSA PEAK VELOCITY: 1.2 CM2/M2
ECHO AV AREA/BSA VTI: 1.3 CM2/M2
ECHO AV CUSP MM: 1.7 CM
ECHO AV MEAN GRADIENT: 5 MMHG
ECHO AV MEAN VELOCITY: 1.1 M/S
ECHO AV PEAK GRADIENT: 9 MMHG
ECHO AV PEAK VELOCITY: 1.5 M/S
ECHO AV VELOCITY RATIO: 0.93
ECHO AV VTI: 37.3 CM
ECHO BSA: 1.42 M2
ECHO EST RA PRESSURE: 3 MMHG
ECHO LA DIAMETER INDEX: 1.87 CM/M2
ECHO LA DIAMETER: 2.6 CM
ECHO LA VOL A-L A2C: 34 ML (ref 22–52)
ECHO LA VOL A-L A4C: 25 ML (ref 22–52)
ECHO LA VOL MOD A2C: 29 ML (ref 22–52)
ECHO LA VOL MOD A4C: 20 ML (ref 22–52)
ECHO LA VOLUME AREA LENGTH: 30 ML
ECHO LA VOLUME INDEX A-L A2C: 24 ML/M2 (ref 16–34)
ECHO LA VOLUME INDEX A-L A4C: 18 ML/M2 (ref 16–34)
ECHO LA VOLUME INDEX AREA LENGTH: 22 ML/M2 (ref 16–34)
ECHO LA VOLUME INDEX MOD A2C: 21 ML/M2 (ref 16–34)
ECHO LA VOLUME INDEX MOD A4C: 14 ML/M2 (ref 16–34)
ECHO LV EF PHYSICIAN: 65 %
ECHO LV FRACTIONAL SHORTENING: 38 % (ref 28–44)
ECHO LV INTERNAL DIMENSION DIASTOLE INDEX: 2.81 CM/M2
ECHO LV INTERNAL DIMENSION DIASTOLIC: 3.9 CM (ref 3.9–5.3)
ECHO LV INTERNAL DIMENSION SYSTOLIC INDEX: 1.73 CM/M2
ECHO LV INTERNAL DIMENSION SYSTOLIC: 2.4 CM
ECHO LV ISOVOLUMETRIC RELAXATION TIME (IVRT): 41.5 MS
ECHO LV IVSD: 1 CM (ref 0.6–0.9)
ECHO LV IVSS: 1.5 CM
ECHO LV MASS 2D: 105.3 G (ref 67–162)
ECHO LV MASS INDEX 2D: 75.8 G/M2 (ref 43–95)
ECHO LV POSTERIOR WALL DIASTOLIC: 0.8 CM (ref 0.6–0.9)
ECHO LV POSTERIOR WALL SYSTOLIC: 1.2 CM
ECHO LV RELATIVE WALL THICKNESS RATIO: 0.41
ECHO LVOT AREA: 2 CM2
ECHO LVOT AV VTI INDEX: 0.92
ECHO LVOT DIAM: 1.6 CM
ECHO LVOT MEAN GRADIENT: 4 MMHG
ECHO LVOT PEAK GRADIENT: 8 MMHG
ECHO LVOT PEAK VELOCITY: 1.4 M/S
ECHO LVOT STROKE VOLUME INDEX: 49.9 ML/M2
ECHO LVOT SV: 69.3 ML
ECHO LVOT VTI: 34.5 CM
ECHO MV "A" WAVE DURATION: 124.6 MSEC
ECHO MV A VELOCITY: 0.82 M/S
ECHO MV AREA PHT: 2.2 CM2
ECHO MV AREA VTI: 1.4 CM2
ECHO MV E DECELERATION TIME (DT): 272.8 MS
ECHO MV E VELOCITY: 1.47 M/S
ECHO MV E/A RATIO: 1.79
ECHO MV EROA PISA: 0.1 CM2
ECHO MV LVOT VTI INDEX: 1.41
ECHO MV MAX VELOCITY: 1.5 M/S
ECHO MV MEAN GRADIENT: 2 MMHG
ECHO MV MEAN VELOCITY: 0.6 M/S
ECHO MV PEAK GRADIENT: 9 MMHG
ECHO MV PRESSURE HALF TIME (PHT): 99.7 MS
ECHO MV REGURGITANT ALIASING (NYQUIST) VELOCITY: 31 CM/S
ECHO MV REGURGITANT RADIUS PISA: 0.36 CM
ECHO MV REGURGITANT VELOCITY PISA: 4.9 M/S
ECHO MV REGURGITANT VOLUME PISA: 9.58 ML
ECHO MV REGURGITANT VTIA: 186.1 CM
ECHO MV VTI: 48.7 CM
ECHO PULMONARY ARTERY END DIASTOLIC PRESSURE: 3 MMHG
ECHO PV MAX VELOCITY: 0.9 M/S
ECHO PV MEAN GRADIENT: 2 MMHG
ECHO PV MEAN VELOCITY: 0.6 M/S
ECHO PV PEAK GRADIENT: 3 MMHG
ECHO PV REGURGITANT MAX VELOCITY: 0.9 M/S
ECHO PV VTI: 21.4 CM
ECHO PVEIN A DURATION: 106.1 MS
ECHO PVEIN A VELOCITY: 0.2 M/S
ECHO PVEIN PEAK D VELOCITY: 0.6 M/S
ECHO PVEIN PEAK S VELOCITY: 0.4 M/S
ECHO PVEIN S/D RATIO: 0.7
ECHO RIGHT VENTRICULAR SYSTOLIC PRESSURE (RVSP): 35 MMHG
ECHO RV INTERNAL DIMENSION: 2.6 CM
ECHO TV REGURGITANT MAX VELOCITY: 2.84 M/S
ECHO TV REGURGITANT PEAK GRADIENT: 32 MMHG

## 2025-03-18 PROCEDURE — 93306 TTE W/DOPPLER COMPLETE: CPT

## 2025-03-18 PROCEDURE — 93306 TTE W/DOPPLER COMPLETE: CPT | Performed by: INTERNAL MEDICINE

## 2025-03-18 NOTE — TELEPHONE ENCOUNTER
Pt mother called and stated that she would like to know the patients results.    Labs attached. Please advise

## 2025-05-08 DIAGNOSIS — E03.9 HYPOTHYROIDISM, UNSPECIFIED TYPE: Primary | ICD-10-CM

## 2025-05-09 ENCOUNTER — TELEPHONE (OUTPATIENT)
Dept: ENDOCRINOLOGY | Age: 40
End: 2025-05-09

## 2025-05-09 RX ORDER — LEVOTHYROXINE SODIUM 75 UG/1
75 TABLET ORAL DAILY
Qty: 90 TABLET | Refills: 1 | Status: SHIPPED | OUTPATIENT
Start: 2025-05-09